# Patient Record
Sex: FEMALE | Race: ASIAN | Employment: FULL TIME | URBAN - METROPOLITAN AREA
[De-identification: names, ages, dates, MRNs, and addresses within clinical notes are randomized per-mention and may not be internally consistent; named-entity substitution may affect disease eponyms.]

---

## 2017-07-05 ENCOUNTER — WORRISOME GROWTH - SEE NOTE (OUTPATIENT)
Dept: URBAN - METROPOLITAN AREA CLINIC 31 | Facility: CLINIC | Age: 37
Setting detail: DERMATOLOGY
End: 2017-07-05

## 2017-07-05 PROCEDURE — 10060 I&D ABSCESS SIMPLE/SINGLE: CPT

## 2020-06-05 ENCOUNTER — OFFICE VISIT (OUTPATIENT)
Dept: URBAN - METROPOLITAN AREA CLINIC 98 | Facility: CLINIC | Age: 40
End: 2020-06-05

## 2020-07-10 ENCOUNTER — OFFICE VISIT (OUTPATIENT)
Dept: URBAN - METROPOLITAN AREA CLINIC 98 | Facility: CLINIC | Age: 40
End: 2020-07-10

## 2020-10-09 ENCOUNTER — OFFICE VISIT (OUTPATIENT)
Dept: URBAN - METROPOLITAN AREA CLINIC 98 | Facility: CLINIC | Age: 40
End: 2020-10-09

## 2021-01-12 ENCOUNTER — OFFICE VISIT (OUTPATIENT)
Dept: URBAN - METROPOLITAN AREA TELEHEALTH 2 | Facility: TELEHEALTH | Age: 41
End: 2021-01-12

## 2021-04-14 ENCOUNTER — TELEPHONE ENCOUNTER (OUTPATIENT)
Dept: URBAN - METROPOLITAN AREA CLINIC 23 | Facility: CLINIC | Age: 41
End: 2021-04-14

## 2021-04-14 PROBLEM — 111891008: Status: ACTIVE | Noted: 2021-04-14

## 2021-04-20 ENCOUNTER — OFFICE VISIT (OUTPATIENT)
Dept: URBAN - METROPOLITAN AREA TELEHEALTH 2 | Facility: TELEHEALTH | Age: 41
End: 2021-04-20

## 2021-06-04 ENCOUNTER — TELEPHONE ENCOUNTER (OUTPATIENT)
Dept: URBAN - METROPOLITAN AREA CLINIC 98 | Facility: CLINIC | Age: 41
End: 2021-06-04

## 2021-06-07 LAB
A/G RATIO: 1.8
AFP, SERUM, TUMOR MARKER: 2.3
ALBUMIN: 4.8
ALKALINE PHOSPHATASE: 62
ALT (SGPT): 13
AST (SGOT): 19
BASO (ABSOLUTE): 0.1
BASOS: 2
BILIRUBIN, TOTAL: 0.2
BUN/CREATININE RATIO: 21
BUN: 15
CALCIUM: 9.2
CARBON DIOXIDE, TOTAL: 22
CHLORIDE: 110
CREATININE: 0.71
EGFR IF AFRICN AM: 122
EGFR IF NONAFRICN AM: 106
EOS (ABSOLUTE): 0.2
EOS: 4
GLOBULIN, TOTAL: 2.6
GLUCOSE: 92
HBSAG CONFIRMATION: POSITIVE
HBSAG SCREEN: (no result)
HBV IU/ML: 540
HEMATOCRIT: 39.4
HEMATOLOGY COMMENTS:: (no result)
HEMOGLOBIN: 13.7
HEP BE AB: POSITIVE
HEP BE AG: NEGATIVE
HEPATITIS B SURF AB QUANT: 6.1
IMMATURE CELLS: (no result)
IMMATURE GRANS (ABS): 0
IMMATURE GRANULOCYTES: 0
LOG10 HBV IU/ML: 2.73
LYMPHS (ABSOLUTE): 1.7
LYMPHS: 43
Lab: (no result)
MCH: 33.1
MCHC: 34.8
MCV: 95
MONOCYTES(ABSOLUTE): 0.3
MONOCYTES: 7
NEUTROPHILS (ABSOLUTE): 1.8
NEUTROPHILS: 44
NRBC: (no result)
PLATELETS: 246
POTASSIUM: 3.9
PROTEIN, TOTAL: 7.4
RBC: 4.14
RDW: 11.9
SODIUM: 144
TEST INFORMATION:: (no result)
WBC: 4.1

## 2021-06-15 ENCOUNTER — OFFICE VISIT (OUTPATIENT)
Dept: URBAN - METROPOLITAN AREA TELEHEALTH 2 | Facility: TELEHEALTH | Age: 41
End: 2021-06-15
Payer: COMMERCIAL

## 2021-06-15 DIAGNOSIS — E78.5 DYSLIPIDEMIA: ICD-10-CM

## 2021-06-15 DIAGNOSIS — B19.10 HBV (HEPATITIS B VIRUS) INFECTION: ICD-10-CM

## 2021-06-15 PROCEDURE — G8420 CALC BMI NORM PARAMETERS: HCPCS | Performed by: INTERNAL MEDICINE

## 2021-06-15 PROCEDURE — G9903 PT SCRN TBCO ID AS NON USER: HCPCS | Performed by: INTERNAL MEDICINE

## 2021-06-15 PROCEDURE — G8482 FLU IMMUNIZE ORDER/ADMIN: HCPCS | Performed by: INTERNAL MEDICINE

## 2021-06-15 PROCEDURE — 1036F TOBACCO NON-USER: CPT | Performed by: INTERNAL MEDICINE

## 2021-06-15 PROCEDURE — 99214 OFFICE O/P EST MOD 30 MIN: CPT | Performed by: INTERNAL MEDICINE

## 2021-06-15 PROCEDURE — G8427 DOCREV CUR MEDS BY ELIG CLIN: HCPCS | Performed by: INTERNAL MEDICINE

## 2021-06-15 NOTE — HPI-TODAY'S VISIT:
Last seen 2019.  Had been in carrier status for Hep B.  No recent ultrasound busy with 2 boys at home - 2.4 yo and 3yo.   annual physical last year- cholesterol was a little high she thinks - everything else was ok she says

## 2021-12-14 ENCOUNTER — OFFICE VISIT (OUTPATIENT)
Dept: URBAN - METROPOLITAN AREA CLINIC 97 | Facility: CLINIC | Age: 41
End: 2021-12-14
Payer: COMMERCIAL

## 2021-12-14 DIAGNOSIS — B19.10 HBV (HEPATITIS B VIRUS) INFECTION: ICD-10-CM

## 2021-12-14 PROCEDURE — 76700 US EXAM ABDOM COMPLETE: CPT | Performed by: INTERNAL MEDICINE

## 2021-12-15 ENCOUNTER — LAB OUTSIDE AN ENCOUNTER (OUTPATIENT)
Dept: URBAN - METROPOLITAN AREA TELEHEALTH 2 | Facility: TELEHEALTH | Age: 41
End: 2021-12-15

## 2021-12-16 LAB
A/G RATIO: 1.7
AFP, SERUM, TUMOR MARKER: 6.2
ALBUMIN: 4.2
ALKALINE PHOSPHATASE: 52
ALT (SGPT): 8
AST (SGOT): 14
BASO (ABSOLUTE): 0
BASOS: 1
BILIRUBIN, TOTAL: 0.2
BUN/CREATININE RATIO: 11
BUN: 8
CALCIUM: 8.8
CARBON DIOXIDE, TOTAL: 22
CHLORIDE: 107
CREATININE: 0.73
EGFR IF AFRICN AM: 117
EGFR IF NONAFRICN AM: 102
EOS (ABSOLUTE): 0.1
EOS: 4
GLOBULIN, TOTAL: 2.5
GLUCOSE: 88
HBV IU/ML: 160
HEMATOCRIT: 30.7
HEMATOLOGY COMMENTS:: (no result)
HEMOGLOBIN: 10.3
HEPATITIS B SURF AB QUANT: 4.3
IMMATURE CELLS: (no result)
IMMATURE GRANS (ABS): 0
IMMATURE GRANULOCYTES: 0
LOG10 HBV IU/ML: 2.2
LYMPHS (ABSOLUTE): 1.6
LYMPHS: 42
Lab: (no result)
MCH: 32.6
MCHC: 33.6
MCV: 97
MONOCYTES(ABSOLUTE): 0.4
MONOCYTES: 9
NEUTROPHILS (ABSOLUTE): 1.7
NEUTROPHILS: 44
NRBC: (no result)
PLATELETS: 274
POTASSIUM: 3.7
PROTEIN, TOTAL: 6.7
RBC: 3.16
RDW: 12.4
SODIUM: 143
TEST INFORMATION:: (no result)
WBC: 3.9

## 2021-12-27 ENCOUNTER — LAB OUTSIDE AN ENCOUNTER (OUTPATIENT)
Dept: URBAN - METROPOLITAN AREA TELEHEALTH 2 | Facility: TELEHEALTH | Age: 41
End: 2021-12-27

## 2021-12-27 ENCOUNTER — OFFICE VISIT (OUTPATIENT)
Dept: URBAN - METROPOLITAN AREA TELEHEALTH 2 | Facility: TELEHEALTH | Age: 41
End: 2021-12-27
Payer: COMMERCIAL

## 2021-12-27 ENCOUNTER — TELEPHONE ENCOUNTER (OUTPATIENT)
Dept: URBAN - METROPOLITAN AREA CLINIC 98 | Facility: CLINIC | Age: 41
End: 2021-12-27

## 2021-12-27 DIAGNOSIS — B19.10 HBV (HEPATITIS B VIRUS) INFECTION: ICD-10-CM

## 2021-12-27 DIAGNOSIS — E78.5 DYSLIPIDEMIA: ICD-10-CM

## 2021-12-27 DIAGNOSIS — D50.0 NORMOCYTIC ANEMIA DUE TO BLOOD LOSS: ICD-10-CM

## 2021-12-27 DIAGNOSIS — B18.1 CHRONIC HEPATITIS B: ICD-10-CM

## 2021-12-27 PROCEDURE — 99214 OFFICE O/P EST MOD 30 MIN: CPT | Performed by: INTERNAL MEDICINE

## 2021-12-27 NOTE — HPI-TODAY'S VISIT:
Last seen on TH 6/2021.   12/2021 normal. Feeling well with her health currently. had a miscarriage before the labwork.  thinks she was about 10 wks pregnant

## 2022-06-14 ENCOUNTER — OFFICE VISIT (OUTPATIENT)
Dept: URBAN - METROPOLITAN AREA CLINIC 97 | Facility: CLINIC | Age: 42
End: 2022-06-14
Payer: COMMERCIAL

## 2022-06-14 DIAGNOSIS — K82.4 GALLBLADDER POLYP: ICD-10-CM

## 2022-06-14 DIAGNOSIS — B18.1 CHRONIC HEPATITIS B: ICD-10-CM

## 2022-06-14 PROCEDURE — 76705 ECHO EXAM OF ABDOMEN: CPT | Performed by: INTERNAL MEDICINE

## 2022-06-16 LAB
A/G RATIO: 2
AFP, SERUM, TUMOR MARKER: 2.2
ALBUMIN: 4.3
ALKALINE PHOSPHATASE: 56
ALT (SGPT): 10
AST (SGOT): 18
BASO (ABSOLUTE): 0.1
BASOS: 1
BILIRUBIN, TOTAL: 0.5
BUN/CREATININE RATIO: 12
BUN: 9
CALCIUM: 9.1
CARBON DIOXIDE, TOTAL: 25
CHLORIDE: 104
CREATININE: 0.73
EGFR: 105
EOS (ABSOLUTE): 0.1
EOS: 2
FERRITIN, SERUM: 75
GLOBULIN, TOTAL: 2.1
GLUCOSE: 93
HBV IU/ML: 80
HEMATOCRIT: 39
HEMATOLOGY COMMENTS:: (no result)
HEMOGLOBIN: 12.9
HEP BE AB: POSITIVE
HEP BE AG: NEGATIVE
HEPATITIS B SURF AB QUANT: 3.3
IMMATURE CELLS: (no result)
IMMATURE GRANS (ABS): 0
IMMATURE GRANULOCYTES: 0
IRON BIND.CAP.(TIBC): 265
IRON SATURATION: 40
IRON: 106
LOG10 HBV IU/ML: 1.9
LYMPHS (ABSOLUTE): 1.3
LYMPHS: 29
Lab: (no result)
MCH: 31.3
MCHC: 33.1
MCV: 95
MONOCYTES(ABSOLUTE): 0.2
MONOCYTES: 6
NEUTROPHILS (ABSOLUTE): 2.8
NEUTROPHILS: 62
NRBC: (no result)
PLATELETS: 197
POTASSIUM: 4
PROTEIN, TOTAL: 6.4
RBC: 4.12
RDW: 12.8
SODIUM: 141
TEST INFORMATION:: (no result)
UIBC: 159
WBC: 4.4

## 2022-06-27 ENCOUNTER — TELEPHONE ENCOUNTER (OUTPATIENT)
Dept: URBAN - METROPOLITAN AREA CLINIC 98 | Facility: CLINIC | Age: 42
End: 2022-06-27

## 2022-06-27 ENCOUNTER — LAB OUTSIDE AN ENCOUNTER (OUTPATIENT)
Dept: URBAN - METROPOLITAN AREA TELEHEALTH 2 | Facility: TELEHEALTH | Age: 42
End: 2022-06-27

## 2022-06-27 ENCOUNTER — OFFICE VISIT (OUTPATIENT)
Dept: URBAN - METROPOLITAN AREA TELEHEALTH 2 | Facility: TELEHEALTH | Age: 42
End: 2022-06-27
Payer: COMMERCIAL

## 2022-06-27 VITALS — WEIGHT: 105 LBS | HEIGHT: 61 IN | BODY MASS INDEX: 19.83 KG/M2

## 2022-06-27 DIAGNOSIS — K82.4 GALLBLADDER POLYP: ICD-10-CM

## 2022-06-27 DIAGNOSIS — D50.0 NORMOCYTIC ANEMIA DUE TO BLOOD LOSS: ICD-10-CM

## 2022-06-27 DIAGNOSIS — E78.5 DYSLIPIDEMIA: ICD-10-CM

## 2022-06-27 DIAGNOSIS — B18.1 CHRONIC HEPATITIS B: ICD-10-CM

## 2022-06-27 PROCEDURE — 99214 OFFICE O/P EST MOD 30 MIN: CPT | Performed by: INTERNAL MEDICINE

## 2022-06-27 NOTE — HPI-TODAY'S VISIT:
Here to follow up for chronic inactive HBV infection  pre clinic US showed normal liver ; no focal lesions. possible tiny gallbladder polyp  labwork with normali liver testing.  HBV DNA 80 iu/ml  no more anemia she feels well, has put on some weight  no abdominal pain

## 2022-10-18 ENCOUNTER — TELEPHONE ENCOUNTER (OUTPATIENT)
Dept: URBAN - METROPOLITAN AREA CLINIC 98 | Facility: CLINIC | Age: 42
End: 2022-10-18

## 2022-12-02 ENCOUNTER — OFFICE VISIT (OUTPATIENT)
Dept: URBAN - METROPOLITAN AREA CLINIC 77 | Facility: CLINIC | Age: 42
End: 2022-12-02
Payer: COMMERCIAL

## 2022-12-02 DIAGNOSIS — K82.4 GALLBLADDER POLYP: ICD-10-CM

## 2022-12-02 PROCEDURE — 76700 US EXAM ABDOM COMPLETE: CPT | Performed by: INTERNAL MEDICINE

## 2022-12-17 LAB
A/G RATIO: 2.1
AFP, SERUM, TUMOR MARKER: 1.9
ALBUMIN: 5.1
ALKALINE PHOSPHATASE: 53
ALT (SGPT): 9
AST (SGOT): 18
BASO (ABSOLUTE): 0.1
BASOS: 1
BILIRUBIN, TOTAL: 0.6
BUN/CREATININE RATIO: 8
BUN: 7
CALCIUM: 9.7
CARBON DIOXIDE, TOTAL: 21
CHLORIDE: 105
CHOLESTEROL, TOTAL: 189
COMMENT:: (no result)
CREATININE: 0.83
EGFR: 90
EOS (ABSOLUTE): 0.1
EOS: 3
GLOBULIN, TOTAL: 2.4
GLUCOSE: 97
HBV IU/ML: 190
HDL CHOLESTEROL: 57
HEMATOCRIT: 44
HEMATOLOGY COMMENTS:: (no result)
HEMOGLOBIN: 14.4
HEP BE AB: POSITIVE
HEP BE AG: NEGATIVE
IMMATURE CELLS: (no result)
IMMATURE GRANS (ABS): 0
IMMATURE GRANULOCYTES: 0
LDL CHOL CALC (NIH): 113
LOG10 HBV IU/ML: 2.28
LYMPHS (ABSOLUTE): 1.4
LYMPHS: 35
Lab: (no result)
MCH: 31.2
MCHC: 32.7
MCV: 95
MONOCYTES(ABSOLUTE): 0.3
MONOCYTES: 6
NEUTROPHILS (ABSOLUTE): 2.3
NEUTROPHILS: 55
NRBC: (no result)
PLATELETS: 229
POTASSIUM: 4.3
PROTEIN, TOTAL: 7.5
RBC: 4.61
RDW: 12.3
SODIUM: 141
TEST INFORMATION:: (no result)
TRIGLYCERIDES: 108
VLDL CHOLESTEROL CAL: 19
WBC: 4.1

## 2022-12-20 ENCOUNTER — TELEPHONE ENCOUNTER (OUTPATIENT)
Dept: URBAN - METROPOLITAN AREA CLINIC 98 | Facility: CLINIC | Age: 42
End: 2022-12-20

## 2022-12-20 ENCOUNTER — WEB ENCOUNTER (OUTPATIENT)
Dept: URBAN - METROPOLITAN AREA CLINIC 98 | Facility: CLINIC | Age: 42
End: 2022-12-20

## 2022-12-20 ENCOUNTER — OFFICE VISIT (OUTPATIENT)
Dept: URBAN - METROPOLITAN AREA CLINIC 98 | Facility: CLINIC | Age: 42
End: 2022-12-20
Payer: COMMERCIAL

## 2022-12-20 VITALS
TEMPERATURE: 97 F | HEART RATE: 64 BPM | BODY MASS INDEX: 19.45 KG/M2 | DIASTOLIC BLOOD PRESSURE: 61 MMHG | SYSTOLIC BLOOD PRESSURE: 95 MMHG | HEIGHT: 61 IN | WEIGHT: 103 LBS

## 2022-12-20 DIAGNOSIS — K82.4 GALLBLADDER POLYP: ICD-10-CM

## 2022-12-20 DIAGNOSIS — B18.1 CHRONIC HEPATITIS B: ICD-10-CM

## 2022-12-20 DIAGNOSIS — E78.5 DYSLIPIDEMIA: ICD-10-CM

## 2022-12-20 DIAGNOSIS — D50.0 NORMOCYTIC ANEMIA DUE TO BLOOD LOSS: ICD-10-CM

## 2022-12-20 PROBLEM — 370992007: Status: ACTIVE | Noted: 2021-06-15

## 2022-12-20 PROBLEM — 413532003: Status: ACTIVE | Noted: 2021-12-27

## 2022-12-20 PROCEDURE — 99214 OFFICE O/P EST MOD 30 MIN: CPT | Performed by: INTERNAL MEDICINE

## 2022-12-20 NOTE — HPI-TODAY'S VISIT:
HEre to follow up for HBV - doing well, but not following a diet , not exercising boys are growing well moved - [place]   12/2022 with stable GB polyp (small)

## 2022-12-27 ENCOUNTER — LAB OUTSIDE AN ENCOUNTER (OUTPATIENT)
Dept: URBAN - METROPOLITAN AREA TELEHEALTH 2 | Facility: TELEHEALTH | Age: 42
End: 2022-12-27

## 2023-03-10 ENCOUNTER — WEB ENCOUNTER (OUTPATIENT)
Dept: URBAN - METROPOLITAN AREA CLINIC 98 | Facility: CLINIC | Age: 43
End: 2023-03-10

## 2023-05-09 ENCOUNTER — TELEPHONE ENCOUNTER (OUTPATIENT)
Dept: URBAN - METROPOLITAN AREA CLINIC 98 | Facility: CLINIC | Age: 43
End: 2023-05-09

## 2023-05-10 ENCOUNTER — OFFICE VISIT (OUTPATIENT)
Dept: URBAN - METROPOLITAN AREA CLINIC 77 | Facility: CLINIC | Age: 43
End: 2023-05-10
Payer: COMMERCIAL

## 2023-05-10 DIAGNOSIS — K82.4 GALLBLADDER POLYP: ICD-10-CM

## 2023-05-10 DIAGNOSIS — B18.1 CHRONIC HEPATITIS B: ICD-10-CM

## 2023-05-10 PROCEDURE — 76700 US EXAM ABDOM COMPLETE: CPT | Performed by: INTERNAL MEDICINE

## 2023-05-18 LAB
A/G RATIO: 1.5
AFP, SERUM, TUMOR MARKER: <1.8
ALBUMIN: 4.4
ALKALINE PHOSPHATASE: 70
ALT (SGPT): 14
AST (SGOT): 18
BASO (ABSOLUTE): 0.1
BASOS: 1
BILIRUBIN, TOTAL: 0.4
BUN/CREATININE RATIO: 12
BUN: 8
CALCIUM: 9.6
CARBON DIOXIDE, TOTAL: 21
CHLORIDE: 104
CHOLESTEROL, TOTAL: 188
COMMENT:: (no result)
CREATININE: 0.68
EGFR: 111
EOS (ABSOLUTE): 0.1
EOS: 1
GLOBULIN, TOTAL: 2.9
GLUCOSE: 92
HBV LOG10: 2.08
HDL CHOLESTEROL: 56
HEMATOCRIT: 40.5
HEMATOLOGY COMMENTS:: (no result)
HEMOGLOBIN: 13.4
HEPATITIS B QUANTITATION: 120
HEPATITIS B SURF AB QUANT: 3.3
IMMATURE CELLS: (no result)
IMMATURE GRANS (ABS): 0
IMMATURE GRANULOCYTES: 0
LDL CHOL CALC (NIH): 117
LYMPHS (ABSOLUTE): 1.6
LYMPHS: 27
MCH: 31.2
MCHC: 33.1
MCV: 94
MONOCYTES(ABSOLUTE): 0.4
MONOCYTES: 7
NEUTROPHILS (ABSOLUTE): 3.7
NEUTROPHILS: 64
NRBC: (no result)
PLATELETS: 291
POTASSIUM: 4.2
PROTEIN, TOTAL: 7.3
RBC: 4.29
RDW: 12.5
SODIUM: 144
TEST INFORMATION:: (no result)
TRIGLYCERIDES: 80
VLDL CHOLESTEROL CAL: 15
WBC: 5.8

## 2023-06-02 ENCOUNTER — OFFICE VISIT (OUTPATIENT)
Dept: URBAN - METROPOLITAN AREA TELEHEALTH 2 | Facility: TELEHEALTH | Age: 43
End: 2023-06-02
Payer: COMMERCIAL

## 2023-06-02 ENCOUNTER — TELEPHONE ENCOUNTER (OUTPATIENT)
Dept: URBAN - METROPOLITAN AREA CLINIC 98 | Facility: CLINIC | Age: 43
End: 2023-06-02

## 2023-06-02 DIAGNOSIS — K82.4 GALLBLADDER POLYP: ICD-10-CM

## 2023-06-02 DIAGNOSIS — E78.5 DYSLIPIDEMIA: ICD-10-CM

## 2023-06-02 DIAGNOSIS — B18.1 CHRONIC HEPATITIS B: ICD-10-CM

## 2023-06-02 DIAGNOSIS — D50.0 NORMOCYTIC ANEMIA DUE TO BLOOD LOSS: ICD-10-CM

## 2023-06-02 PROBLEM — 61977001: Status: ACTIVE | Noted: 2021-12-27

## 2023-06-02 PROCEDURE — 99214 OFFICE O/P EST MOD 30 MIN: CPT | Performed by: INTERNAL MEDICINE

## 2023-06-20 ENCOUNTER — LAB OUTSIDE AN ENCOUNTER (OUTPATIENT)
Dept: URBAN - METROPOLITAN AREA CLINIC 98 | Facility: CLINIC | Age: 43
End: 2023-06-20

## 2023-10-17 ENCOUNTER — TELEPHONE ENCOUNTER (OUTPATIENT)
Dept: URBAN - METROPOLITAN AREA CLINIC 98 | Facility: CLINIC | Age: 43
End: 2023-10-17

## 2023-10-18 ENCOUNTER — TELEPHONE ENCOUNTER (OUTPATIENT)
Dept: URBAN - METROPOLITAN AREA CLINIC 98 | Facility: CLINIC | Age: 43
End: 2023-10-18

## 2024-04-24 ENCOUNTER — US (OUTPATIENT)
Dept: URBAN - METROPOLITAN AREA CLINIC 77 | Facility: CLINIC | Age: 44
End: 2024-04-24
Payer: COMMERCIAL

## 2024-04-24 DIAGNOSIS — K82.4 GALLBLADDER POLYP: ICD-10-CM

## 2024-04-24 PROCEDURE — 76700 US EXAM ABDOM COMPLETE: CPT | Performed by: INTERNAL MEDICINE

## 2024-05-08 LAB
A/G RATIO: 1.7
AFP, SERUM, TUMOR MARKER: 1.8
ALBUMIN: 4.6
ALKALINE PHOSPHATASE: 55
ALT (SGPT): 19
AST (SGOT): 18
BASO (ABSOLUTE): 0.1
BASOS: 1
BILIRUBIN, TOTAL: 0.4
BUN/CREATININE RATIO: 14
BUN: 10
CALCIUM: 9.2
CARBON DIOXIDE, TOTAL: 22
CHLORIDE: 104
CREATININE: 0.69
EGFR: 110
EOS (ABSOLUTE): 0.1
EOS: 1
GLOBULIN, TOTAL: 2.7
GLUCOSE: 77
HBSAG CONFIRMATION: NEGATIVE
HBSAG SCREEN: (no result)
HBV LOG10: 2.3
HEMATOCRIT: 42
HEMATOLOGY COMMENTS:: (no result)
HEMOGLOBIN: 13.9
HEP BE AB: REACTIVE
HEP BE AG: NEGATIVE
HEPATITIS B QUANTITATION: 200
HEPATITIS B SURF AB QUANT: <3.5
IMMATURE CELLS: (no result)
IMMATURE GRANS (ABS): 0
IMMATURE GRANULOCYTES: 0
LYMPHS (ABSOLUTE): 1.6
LYMPHS: 30
MCH: 31.8
MCHC: 33.1
MCV: 96
MONOCYTES(ABSOLUTE): 0.4
MONOCYTES: 8
NEUTROPHILS (ABSOLUTE): 3.1
NEUTROPHILS: 60
NRBC: (no result)
PLATELETS: 225
POTASSIUM: 4
PROTEIN, TOTAL: 7.3
RBC: 4.37
RDW: 12.4
SODIUM: 137
TEST INFORMATION:: (no result)
WBC: 5.3

## 2024-05-10 ENCOUNTER — DASHBOARD ENCOUNTERS (OUTPATIENT)
Age: 44
End: 2024-05-10

## 2024-05-10 ENCOUNTER — TELEPHONE ENCOUNTER (OUTPATIENT)
Dept: URBAN - METROPOLITAN AREA CLINIC 98 | Facility: CLINIC | Age: 44
End: 2024-05-10

## 2024-05-10 ENCOUNTER — OFFICE VISIT (OUTPATIENT)
Dept: URBAN - METROPOLITAN AREA TELEHEALTH 2 | Facility: TELEHEALTH | Age: 44
End: 2024-05-10
Payer: COMMERCIAL

## 2024-05-10 DIAGNOSIS — K82.4 GALLBLADDER POLYP: ICD-10-CM

## 2024-05-10 DIAGNOSIS — B18.1 CHRONIC HEPATITIS B: ICD-10-CM

## 2024-05-10 DIAGNOSIS — Z12.11 COLON CANCER SCREENING: ICD-10-CM

## 2024-05-10 DIAGNOSIS — E78.5 DYSLIPIDEMIA: ICD-10-CM

## 2024-05-10 PROCEDURE — 99213 OFFICE O/P EST LOW 20 MIN: CPT | Performed by: INTERNAL MEDICINE

## 2024-06-02 ENCOUNTER — LAB OUTSIDE AN ENCOUNTER (OUTPATIENT)
Dept: URBAN - METROPOLITAN AREA TELEHEALTH 2 | Facility: TELEHEALTH | Age: 44
End: 2024-06-02

## 2025-02-17 ENCOUNTER — LAB OUTSIDE AN ENCOUNTER (OUTPATIENT)
Dept: URBAN - METROPOLITAN AREA SURGERY CENTER 15 | Facility: SURGERY CENTER | Age: 45
End: 2025-02-17

## 2025-02-20 ENCOUNTER — OFFICE VISIT (OUTPATIENT)
Dept: URBAN - METROPOLITAN AREA SURGERY CENTER 15 | Facility: SURGERY CENTER | Age: 45
End: 2025-02-20
Payer: COMMERCIAL

## 2025-02-20 DIAGNOSIS — Z12.11 COLON CANCER SCREENING: ICD-10-CM

## 2025-02-20 PROCEDURE — 00812 ANES LWR INTST SCR COLSC: CPT | Performed by: NURSE ANESTHETIST, CERTIFIED REGISTERED

## 2025-02-20 PROCEDURE — 45378 DIAGNOSTIC COLONOSCOPY: CPT | Performed by: INTERNAL MEDICINE

## 2025-02-20 PROCEDURE — 0528F RCMND FLW-UP 10 YRS DOCD: CPT | Performed by: INTERNAL MEDICINE

## 2025-04-25 ENCOUNTER — TELEPHONE ENCOUNTER (OUTPATIENT)
Dept: URBAN - METROPOLITAN AREA CLINIC 98 | Facility: CLINIC | Age: 45
End: 2025-04-25

## 2025-05-07 ENCOUNTER — OFFICE VISIT (OUTPATIENT)
Dept: URBAN - METROPOLITAN AREA CLINIC 77 | Facility: CLINIC | Age: 45
End: 2025-05-07

## 2025-05-09 ENCOUNTER — LAB OUTSIDE AN ENCOUNTER (OUTPATIENT)
Dept: URBAN - METROPOLITAN AREA CLINIC 98 | Facility: CLINIC | Age: 45
End: 2025-05-09

## 2025-05-10 ENCOUNTER — LAB OUTSIDE AN ENCOUNTER (OUTPATIENT)
Dept: URBAN - METROPOLITAN AREA TELEHEALTH 2 | Facility: TELEHEALTH | Age: 45
End: 2025-05-10

## 2025-05-11 LAB
AFP, SERUM, TUMOR MARKER: 2.2
ALBUMIN: 4.7
ALKALINE PHOSPHATASE: 78
ALT (SGPT): 15
AST (SGOT): 22
BASO (ABSOLUTE): 0.1
BASOS: 1
BILIRUBIN, TOTAL: 0.2
BUN/CREATININE RATIO: 18
BUN: 13
CALCIUM: 9.3
CARBON DIOXIDE, TOTAL: 20
CHLORIDE: 106
CREATININE: 0.74
EGFR: 102
EOS (ABSOLUTE): 0.1
EOS: 4
GLOBULIN, TOTAL: 2.6
GLUCOSE: 99
HBSAG CONFIRMATION: POSITIVE
HBSAG SCREEN: (no result)
HBV LOG10: 2.36
HEMATOCRIT: 41.9
HEMATOLOGY COMMENTS:: (no result)
HEMOGLOBIN: 13.6
HEP B SURFACE ANTIGEN QUANT: 0.03
HEP BE AB: REACTIVE
HEP BE AG: NEGATIVE
HEPATITIS B QUANTITATION: 230
HEPATITIS B SURF AB QUANT: <3.5
IMMATURE CELLS: (no result)
IMMATURE GRANS (ABS): 0
IMMATURE GRANULOCYTES: 0
LYMPHS (ABSOLUTE): 1.1
LYMPHS: 31
MCH: 31.2
MCHC: 32.5
MCV: 96
MONOCYTES(ABSOLUTE): 0.2
MONOCYTES: 6
NEUTROPHILS (ABSOLUTE): 2.1
NEUTROPHILS: 58
NRBC: (no result)
PLATELETS: 251
POTASSIUM: 4.2
PROTEIN, TOTAL: 7.3
RBC: 4.36
RDW: 12.3
SODIUM: 140
TEST INFORMATION:: (no result)
WBC: 3.7

## 2025-05-16 ENCOUNTER — OFFICE VISIT (OUTPATIENT)
Dept: URBAN - METROPOLITAN AREA TELEHEALTH 2 | Facility: TELEHEALTH | Age: 45
End: 2025-05-16
Payer: COMMERCIAL

## 2025-05-16 DIAGNOSIS — K82.4 GALLBLADDER POLYP: ICD-10-CM

## 2025-05-16 DIAGNOSIS — E78.5 DYSLIPIDEMIA: ICD-10-CM

## 2025-05-16 DIAGNOSIS — B18.1 CHRONIC HEPATITIS B: ICD-10-CM

## 2025-05-16 PROCEDURE — 99213 OFFICE O/P EST LOW 20 MIN: CPT | Performed by: INTERNAL MEDICINE

## 2025-05-16 NOTE — HPI-TODAY'S VISIT:
Here for annual follow up for hepatitis B moved and now not as active ; plans for summer to increase exercise  had rash on face : allergy : resolved  did have colonoscopy w Dr Magaña early this year  UTD on mammogram

## 2025-05-16 NOTE — HPI-OTHER HISTORIES
Accession ID: [id number]  Order Date:  05/09/2025 Performed Date:  05/09/2025  12:55:00  Requesting Physician: Marine TrianaiOrdering Physician: Marine Triana US Abdomen (Ultrasound)  REPORTExam:  [facility], [address] [place], [place] [address]N[place] #: [phone]: [phone]   Name: Rosalino Jerry   Exam Date:    5/9/2025 12:55 PMDOB: 1980                 Secondary ID: [id number]: Female     JAN:          [id number] #: [id number]:  Marine Triana MD, PhDExam Name: US Abdomen Complete   [id number]  EXAMINATION: COMPLETE ABDOMINAL ULTRASOUND  DATE OF EXAMINATION:5/9/2025. COMPARISON: None available.  INDICATION:Chronic hepatitis B. TECHNIQUE: Grey scale sonographic images of the abdomen were performed.  FINDINGS:  Pancreas: The visualized pancreas is within normal limits..  IVC and Aorta: The visualized aorta and IVC are unremarkable.. Liver: The liver is normal in size and echotexture without focal mass or dilatation of the biliary tree. There ishepatopedal flow in the main portal vein. The common bile duct measures 1.1 mm. Gallbladder: No gallstones, gallbladder wall thickening or pericholecystic fluid.  Kidneys: The right kidney measures 10.3 cm and the left kidney measures 9.6 cm. There is no hydronephrosis,shadowing stone, or definite mass.   Spleen: Normal without splenomegaly.  Additional significant findings: None.   IMPRESSION:1. No cholelithiasis or cholecystitis..2. No biliary ductal dilation.3. No renal stones or hydronephrosis.

## 2025-05-20 ENCOUNTER — OFFICE VISIT (OUTPATIENT)
Dept: URBAN - METROPOLITAN AREA CLINIC 77 | Facility: CLINIC | Age: 45
End: 2025-05-20